# Patient Record
Sex: FEMALE | Race: WHITE | Employment: FULL TIME | URBAN - METROPOLITAN AREA
[De-identification: names, ages, dates, MRNs, and addresses within clinical notes are randomized per-mention and may not be internally consistent; named-entity substitution may affect disease eponyms.]

---

## 2024-06-30 ENCOUNTER — OFFICE VISIT (OUTPATIENT)
Dept: FAMILY MEDICINE | Facility: CLINIC | Age: 19
End: 2024-06-30
Payer: COMMERCIAL

## 2024-06-30 VITALS
SYSTOLIC BLOOD PRESSURE: 121 MMHG | RESPIRATION RATE: 16 BRPM | WEIGHT: 165 LBS | TEMPERATURE: 98 F | HEART RATE: 84 BPM | OXYGEN SATURATION: 96 % | DIASTOLIC BLOOD PRESSURE: 78 MMHG

## 2024-06-30 DIAGNOSIS — H66.003 NON-RECURRENT ACUTE SUPPURATIVE OTITIS MEDIA OF BOTH EARS WITHOUT SPONTANEOUS RUPTURE OF TYMPANIC MEMBRANES: Primary | ICD-10-CM

## 2024-06-30 PROCEDURE — 99203 OFFICE O/P NEW LOW 30 MIN: CPT | Performed by: NURSE PRACTITIONER

## 2024-06-30 RX ORDER — CEFDINIR 300 MG/1
300 CAPSULE ORAL 2 TIMES DAILY
Qty: 14 CAPSULE | Refills: 0 | Status: SHIPPED | OUTPATIENT
Start: 2024-06-30 | End: 2024-07-07

## 2024-06-30 NOTE — PATIENT INSTRUCTIONS
Omnicef for double ear infection  Rotate tylenol ibuprofen  Stop med if allergic reaction such as rash and call us  Would switch to zpak if so  Cross reactivity is rare with penicillin allergies  Sudafed flonase zyrtec   Pt is calling to check status of this. Please call him when completed.   Thank you,  Ewa Angeles- Pt Rep.

## 2024-06-30 NOTE — PROGRESS NOTES
Chief Complaint   Patient presents with    Urgent Care     Bilateral ear pain from flying today  Left ear is still giving her an issue, right ear popped   Pain      SUBJECTIVE:  Clemencia Chairez is a 19 year old female presenting with bilateral earache after flight today.  She was recently traveling and was also swimming in the ocean.  Had a cold with congestion the week preceding.  No drainage from ears fevers tragus tenderness.    Patient's mom cannot recall her amoxicillin allergy, thinks it might have been a low-grade rash.  No anaphylaxis.  She has not had cephalosporins in the past.    No past medical history on file.  Current Outpatient Medications   Medication Sig Dispense Refill    cefdinir (OMNICEF) 300 MG capsule Take 1 capsule (300 mg) by mouth 2 times daily for 7 days 14 capsule 0     No current facility-administered medications for this visit.     Social History     Tobacco Use    Smoking status: Not on file    Smokeless tobacco: Not on file   Substance Use Topics    Alcohol use: Not on file     Allergies   Allergen Reactions    Amoxicillin Rash       Review of Systems  All systems negative except for those listed above in HPI.    OBJECTIVE:   /78   Pulse 84   Temp 98  F (36.7  C) (Oral)   Resp 16   Wt 74.8 kg (165 lb)   LMP 05/30/2024 (Approximate)   SpO2 96%     Physical Exam  Vitals reviewed.   Constitutional:       General: She is not in acute distress.     Appearance: She is well-developed. She is not ill-appearing, toxic-appearing or diaphoretic.   HENT:      Head: Normocephalic and atraumatic.      Ears:      Comments: Bilateral erythematous bulging TMs.  Canals are unremarkable.     Mouth/Throat:      Pharynx: No oropharyngeal exudate.   Eyes:      Conjunctiva/sclera: Conjunctivae normal.      Pupils: Pupils are equal, round, and reactive to light.   Cardiovascular:      Rate and Rhythm: Normal rate.      Pulses: Normal pulses.   Pulmonary:      Effort: Pulmonary effort is normal. No  respiratory distress.   Musculoskeletal:         General: Normal range of motion.      Cervical back: Normal range of motion and neck supple.   Lymphadenopathy:      Cervical: No cervical adenopathy.   Skin:     General: Skin is warm.      Capillary Refill: Capillary refill takes less than 2 seconds.      Findings: No rash.   Neurological:      General: No focal deficit present.      Mental Status: She is alert and oriented to person, place, and time.   Psychiatric:         Mood and Affect: Mood normal.         Behavior: Behavior normal.       ASSESSMENT:    ICD-10-CM    1. Non-recurrent acute suppurative otitis media of both ears without spontaneous rupture of tympanic membranes  H66.003 cefdinir (OMNICEF) 300 MG capsule        PLAN:     Omnicef for double ear infection  Rotate tylenol ibuprofen  Stop med if allergic reaction such as rash and call us  Would switch to zpak if so  Cross reactivity is rare with penicillin allergies  Sudafed flonase zyrtec    Follow up with primary care provider with any problems, questions or concerns or if symptoms worsen or fail to improve. Patient agreed to plan and verbalized understanding.    TOYA Jones-BC  [facility]